# Patient Record
Sex: MALE | Race: WHITE | NOT HISPANIC OR LATINO | Employment: FULL TIME | URBAN - METROPOLITAN AREA
[De-identification: names, ages, dates, MRNs, and addresses within clinical notes are randomized per-mention and may not be internally consistent; named-entity substitution may affect disease eponyms.]

---

## 2018-04-30 LAB
25(OH)D3 SERPL-MCNC: 34.22 NG/ML
ALBUMIN SERPL BCP-MCNC: 4.5 G/DL (ref 3.5–5.7)
ALP SERPL-CCNC: 50 IU/L (ref 40–150)
ALT SERPL W P-5'-P-CCNC: 20 IU/L (ref 0–50)
ANION GAP SERPL CALCULATED.3IONS-SCNC: 10.5 MM/L
AST SERPL W P-5'-P-CCNC: 20 U/L (ref 8–27)
BASOPHILS # BLD AUTO: 0 X3/UL (ref 0–0.3)
BASOPHILS # BLD AUTO: 0.6 % (ref 0–2)
BILIRUB SERPL-MCNC: 0.5 MG/DL (ref 0.3–1)
BUN SERPL-MCNC: 9 MG/DL (ref 7–25)
CALCIUM SERPL-MCNC: 9.5 MG/DL (ref 8.6–10.5)
CHLORIDE SERPL-SCNC: 102 MM/L (ref 98–107)
CHOLEST SERPL-MCNC: 183 MG/DL (ref 0–200)
CO2 SERPL-SCNC: 32 MM/L (ref 21–31)
CREAT SERPL-MCNC: 0.97 MG/DL (ref 0.7–1.3)
DEPRECATED RDW RBC AUTO: 12.9 % (ref 11.5–14.5)
EGFR (HISTORICAL): > 60 GFR
EGFR AFRICAN AMERICAN (HISTORICAL): > 60 GFR
EOSINOPHIL # BLD AUTO: 0.1 X3/UL (ref 0–0.5)
EOSINOPHIL NFR BLD AUTO: 1.9 % (ref 0–5)
GLUCOSE (HISTORICAL): 105 MG/DL (ref 65–99)
HCT VFR BLD AUTO: 43 % (ref 42–52)
HDLC SERPL-MCNC: 57 MG/DL (ref 40–60)
HGB BLD-MCNC: 14.2 G/DL (ref 14–18)
LDLC SERPL CALC-MCNC: 109.7 MG/DL (ref 75–193)
LYMPHOCYTES # BLD AUTO: 1.7 X3/UL (ref 1.2–4.2)
LYMPHOCYTES NFR BLD AUTO: 40 % (ref 20.5–51.1)
MCH RBC QN AUTO: 28.7 PG (ref 26–34)
MCHC RBC AUTO-ENTMCNC: 33.1 G/DL (ref 31–36)
MCV RBC AUTO: 86.8 FL (ref 81–99)
MONOCYTES # BLD AUTO: 0.3 X3/UL (ref 0–1)
MONOCYTES NFR BLD AUTO: 6 % (ref 1.7–12)
NEUTROPHILS # BLD AUTO: 2.2 X3/UL (ref 1.4–6.5)
NEUTS SEG NFR BLD AUTO: 51.5 % (ref 42.2–75.2)
OSMOLALITY, SERUM (HISTORICAL): 278 MOSM (ref 262–291)
PLATELET # BLD AUTO: 237 X3/UL (ref 130–400)
PMV BLD AUTO: 8 FL (ref 8.6–11.7)
POTASSIUM SERPL-SCNC: 4.5 MM/L (ref 3.5–5.5)
PSA (HISTORICAL): 0.59 NG/ML (ref 0–4)
RBC # BLD AUTO: 4.95 X6/UL (ref 4.3–5.9)
SODIUM SERPL-SCNC: 140 MM/L (ref 134–143)
TOTAL PROTEIN (HISTORICAL): 7 G/DL (ref 6.4–8.9)
TRIGL SERPL-MCNC: 80 MG/DL (ref 44–166)
VLDL CHOLESTEROL (HISTORICAL): 16 MG/DL (ref 5–51)
WBC # BLD AUTO: 4.4 X3/UL (ref 4.8–10.8)

## 2019-05-21 ENCOUNTER — TRANSCRIBE ORDERS (OUTPATIENT)
Dept: ADMINISTRATIVE | Facility: HOSPITAL | Age: 49
End: 2019-05-21

## 2019-05-21 ENCOUNTER — APPOINTMENT (OUTPATIENT)
Dept: LAB | Facility: HOSPITAL | Age: 49
End: 2019-05-21
Payer: COMMERCIAL

## 2019-05-21 DIAGNOSIS — D72.819 LEUKOPENIA, UNSPECIFIED TYPE: ICD-10-CM

## 2019-05-21 DIAGNOSIS — N42.9 DISEASE OF PROSTATE: ICD-10-CM

## 2019-05-21 DIAGNOSIS — N42.9 DISEASE OF PROSTATE: Primary | ICD-10-CM

## 2019-05-21 DIAGNOSIS — Z51.81 ENCOUNTER FOR THERAPEUTIC DRUG MONITORING: ICD-10-CM

## 2019-05-21 DIAGNOSIS — R73.9 HYPERGLYCEMIA: ICD-10-CM

## 2019-05-21 DIAGNOSIS — R35.0 URINARY FREQUENCY: ICD-10-CM

## 2019-05-21 DIAGNOSIS — E78.5 HYPERLIPIDEMIA, UNSPECIFIED HYPERLIPIDEMIA TYPE: ICD-10-CM

## 2019-05-21 LAB
ALBUMIN SERPL BCP-MCNC: 4.5 G/DL (ref 3.5–5.7)
ALP SERPL-CCNC: 52 U/L (ref 40–150)
ALT SERPL W P-5'-P-CCNC: 47 U/L (ref 7–52)
ANION GAP SERPL CALCULATED.3IONS-SCNC: 4 MMOL/L (ref 4–13)
AST SERPL W P-5'-P-CCNC: 31 U/L (ref 13–39)
BASOPHILS # BLD AUTO: 0 THOUSANDS/ΜL (ref 0–0.1)
BASOPHILS NFR BLD AUTO: 1 % (ref 0–2)
BILIRUB SERPL-MCNC: 0.9 MG/DL (ref 0.2–1)
BUN SERPL-MCNC: 10 MG/DL (ref 7–25)
CALCIUM SERPL-MCNC: 10 MG/DL (ref 8.6–10.5)
CHLORIDE SERPL-SCNC: 103 MMOL/L (ref 98–107)
CHOLEST SERPL-MCNC: 193 MG/DL (ref 0–200)
CO2 SERPL-SCNC: 33 MMOL/L (ref 21–31)
CREAT SERPL-MCNC: 1.01 MG/DL (ref 0.7–1.3)
EOSINOPHIL # BLD AUTO: 0.1 THOUSAND/ΜL (ref 0–0.61)
EOSINOPHIL NFR BLD AUTO: 2 % (ref 0–5)
ERYTHROCYTE [DISTWIDTH] IN BLOOD BY AUTOMATED COUNT: 12.2 % (ref 11.5–14.5)
GFR SERPL CREATININE-BSD FRML MDRD: 86 ML/MIN/1.73SQ M
GLUCOSE P FAST SERPL-MCNC: 115 MG/DL (ref 65–99)
HCT VFR BLD AUTO: 44.2 % (ref 42–47)
HDLC SERPL-MCNC: 57 MG/DL (ref 40–60)
HGB BLD-MCNC: 14.9 G/DL (ref 14–18)
LDLC SERPL CALC-MCNC: 104 MG/DL (ref 0–100)
LYMPHOCYTES # BLD AUTO: 1.5 THOUSANDS/ΜL (ref 0.6–4.47)
LYMPHOCYTES NFR BLD AUTO: 31 % (ref 21–51)
MCH RBC QN AUTO: 29.4 PG (ref 26–34)
MCHC RBC AUTO-ENTMCNC: 33.8 G/DL (ref 31–37)
MCV RBC AUTO: 87 FL (ref 81–99)
MONOCYTES # BLD AUTO: 0.3 THOUSAND/ΜL (ref 0.17–1.22)
MONOCYTES NFR BLD AUTO: 6 % (ref 2–12)
NEUTROPHILS # BLD AUTO: 3 THOUSANDS/ΜL (ref 1.4–6.5)
NEUTS SEG NFR BLD AUTO: 61 % (ref 42–75)
NONHDLC SERPL-MCNC: 136 MG/DL
PLATELET # BLD AUTO: 227 THOUSANDS/UL (ref 149–390)
PMV BLD AUTO: 7.9 FL (ref 8.6–11.7)
POTASSIUM SERPL-SCNC: 4.3 MMOL/L (ref 3.5–5.5)
PROT SERPL-MCNC: 7.4 G/DL (ref 6.4–8.9)
PSA SERPL-MCNC: 0.5 NG/ML (ref 0–4)
RBC # BLD AUTO: 5.08 MILLION/UL (ref 4.3–5.9)
SODIUM SERPL-SCNC: 140 MMOL/L (ref 134–143)
TRIGL SERPL-MCNC: 158 MG/DL (ref 44–166)
WBC # BLD AUTO: 4.9 THOUSAND/UL (ref 4.8–10.8)

## 2019-05-21 PROCEDURE — 80053 COMPREHEN METABOLIC PANEL: CPT

## 2019-05-21 PROCEDURE — G0103 PSA SCREENING: HCPCS

## 2019-05-21 PROCEDURE — 36415 COLL VENOUS BLD VENIPUNCTURE: CPT

## 2019-05-21 PROCEDURE — 85025 COMPLETE CBC W/AUTO DIFF WBC: CPT

## 2019-05-21 PROCEDURE — 80061 LIPID PANEL: CPT

## 2019-12-14 ENCOUNTER — OFFICE VISIT (OUTPATIENT)
Dept: URGENT CARE | Facility: CLINIC | Age: 49
End: 2019-12-14
Payer: COMMERCIAL

## 2019-12-14 VITALS
BODY MASS INDEX: 20.76 KG/M2 | RESPIRATION RATE: 18 BRPM | OXYGEN SATURATION: 97 % | DIASTOLIC BLOOD PRESSURE: 70 MMHG | HEART RATE: 92 BPM | HEIGHT: 70 IN | TEMPERATURE: 99 F | WEIGHT: 145 LBS | SYSTOLIC BLOOD PRESSURE: 120 MMHG

## 2019-12-14 DIAGNOSIS — B96.89 ACUTE BACTERIAL BRONCHITIS: Primary | ICD-10-CM

## 2019-12-14 DIAGNOSIS — J20.8 ACUTE BACTERIAL BRONCHITIS: Primary | ICD-10-CM

## 2019-12-14 PROCEDURE — 99203 OFFICE O/P NEW LOW 30 MIN: CPT | Performed by: PHYSICIAN ASSISTANT

## 2019-12-14 RX ORDER — SILDENAFIL CITRATE 20 MG/1
TABLET ORAL
Refills: 2 | COMMUNITY
Start: 2019-10-21

## 2019-12-14 RX ORDER — GABAPENTIN 100 MG/1
CAPSULE ORAL
Refills: 0 | COMMUNITY
Start: 2019-12-03

## 2019-12-14 RX ORDER — HYDROCODONE BITARTRATE AND ACETAMINOPHEN 10; 325 MG/1; MG/1
1 TABLET ORAL EVERY 8 HOURS
Refills: 0 | COMMUNITY
Start: 2019-11-20

## 2019-12-14 RX ORDER — AZITHROMYCIN 250 MG/1
TABLET, FILM COATED ORAL
Qty: 6 TABLET | Refills: 0 | Status: SHIPPED | OUTPATIENT
Start: 2019-12-14 | End: 2019-12-18

## 2019-12-14 RX ORDER — PREGABALIN 75 MG/1
CAPSULE ORAL
Refills: 5 | COMMUNITY
Start: 2019-09-18

## 2019-12-14 RX ORDER — PREDNISONE 50 MG/1
50 TABLET ORAL DAILY
Qty: 5 TABLET | Refills: 0 | Status: SHIPPED | OUTPATIENT
Start: 2019-12-14 | End: 2019-12-19

## 2019-12-14 NOTE — PATIENT INSTRUCTIONS
Prescriptions sent to the pharmacy for prednisone and azithromycin-use as directed  May take over-the-counter decongestants/cold medication for symptoms  Saline nasal spray several times daily, cool mist humidifier, Tylenol/ibuprofen as needed for fever or pain  Follow up with PCP in 3-5 days  Proceed to  ER if symptoms worsen  Acute Bronchitis   AMBULATORY CARE:   Acute bronchitis  is swelling and irritation in the air passages of your lungs  This irritation may cause you to cough or have other breathing problems  Acute bronchitis often starts because of another illness, such as a cold or the flu  The illness spreads from your nose and throat to your windpipe and airways  Bronchitis is often called a chest cold  Acute bronchitis lasts about 3 to 6 weeks and is usually not a serious illness  Your cough can last for several weeks  You may have any of the following symptoms:   · A cough with sputum that may be clear, yellow, or green    · Feeling more tired than usual, and body aches    · A fever and chills    · Wheezing when you breathe    · A tight chest or pain when you breathe or cough  Seek care immediately if:   · You cough up blood  · Your lips or fingernails turn blue  · You feel like you are not getting enough air when you breathe  Contact your healthcare provider if:   · You have a fever  · Your breathing problems do not go away or get worse  · Your cough does not get better within 4 weeks  · You have questions or concerns about your condition or care  Self-care:   · Get more rest   Rest helps your body to heal  Slowly start to do more each day  Rest when you feel it is needed  · Avoid irritants in the air  Avoid chemicals, fumes, and dust  Wear a face mask if you must work around dust or fumes  Stay inside on days when air pollution levels are high  If you have allergies, stay inside when pollen counts are high   Do not use aerosol products, such as spray-on deodorant, bug spray, and hair spray  · Do not smoke or be around others who smoke  Nicotine and other chemicals in cigarettes and cigars damages the cilia that move mucus out of your lungs  Ask your healthcare provider for information if you currently smoke and need help to quit  E-cigarettes or smokeless tobacco still contain nicotine  Talk to your healthcare provider before you use these products  · Drink liquids as directed  Liquids help keep your air passages moist and help you cough up mucus  You may need to drink more liquids when you have acute bronchitis  Ask how much liquid to drink each day and which liquids are best for you  · Use a humidifier or vaporizer  Use a cool mist humidifier or a vaporizer to increase air moisture in your home  This may make it easier for you to breathe and help decrease your cough  Prevent acute bronchitis by doing the following:   · Get the vaccinations you need  Ask your healthcare provider if you should get vaccinated against the flu or pneumonia  · Prevent the spread of germs  You can decrease your risk of acute bronchitis and other illnesses by doing the following:     [facility] your hands often with soap and water  Carry germ-killing hand lotion or gel with you  You can use the lotion or gel to clean your hands when soap and water are not available  ¨ Do not touch your eyes, nose, or mouth unless you have washed your hands first     ¨ Always cover your mouth when you cough to prevent the spread of germs  It is best to cough into a tissue or your shirt sleeve instead of into your hand  Ask those around you cover their mouths when they cough  ¨ Try to avoid people who have a cold or the flu  If you are sick, stay away from others as much as possible  Medicines: Your healthcare provider may  give you any of the following:  · Ibuprofen or acetaminophen  are medicines that help lower your fever  They are available without a doctor's order   Ask your healthcare provider which medicine is right for you  Ask how much to take and how often to take it  Follow directions  These medicines can cause stomach bleeding if not taken correctly  Ibuprofen can cause kidney damage  Do not take ibuprofen if you have kidney disease, an ulcer, or allergies to aspirin  Acetaminophen can cause liver damage  Do not take more than 4,000 milligrams in 24 hours  · Decongestants  help loosen mucus in your lungs and make it easier to cough up  This can help you breathe easier  · Cough suppressants  decrease your urge to cough  If your cough produces mucus, do not take a cough suppressant unless your healthcare provider tells you to  Your healthcare provider may suggest that you take a cough suppressant at night so you can rest     · Inhalers  may be given  Your healthcare provider may give you one or more inhalers to help you breathe easier and cough less  An inhaler gives your medicine to open your airways  Ask your healthcare provider to show you how to use your inhaler correctly  Follow up with your healthcare provider as directed:  Write down questions you have so you will remember to ask them during your follow-up visits  © 2017 [facility] Information is for End User's use only and may not be sold, redistributed or otherwise used for commercial purposes  All illustrations and images included in CareNotes® are the copyrighted property of [employer]  or Jarod Quiles  The above information is an  only  It is not intended as medical advice for individual conditions or treatments  Talk to your doctor, nurse or pharmacist before following any medical regimen to see if it is safe and effective for you

## 2019-12-14 NOTE — PROGRESS NOTES
[facility]        NAME: Lesli Couch is a 44 y o  male  : 1970    MRN: [id number]  DATE: 2019  TIME: 1:07 PM    Assessment and Plan   Acute bacterial bronchitis [J20 8, B96 89]  1  Acute bacterial bronchitis  azithromycin (ZITHROMAX) 250 mg tablet    predniSONE 50 mg tablet         Patient Instructions   Prescriptions sent to the pharmacy for prednisone and azithromycin-use as directed  May take over-the-counter decongestants/cold medication for symptoms  Saline nasal spray several times daily, cool mist humidifier, Tylenol/ibuprofen as needed for fever or pain  Follow up with PCP in 3-5 days  Proceed to  ER if symptoms worsen  Chief Complaint     Chief Complaint   Patient presents with    Sore Throat     Patient has been sick for the last week    Cold Like Symptoms    Cough         History of Present Illness   The patient is a 24-year-old male who presents with cold symptoms for approximately 1 week  Positive nasal and sinus congestion  Negative facial pain or pressure  Negative headache  Positive fatigue  Positive fever  Positive sore throat with postnasal drip  Productive cough with occasional shortness of breath and chest tightness  Negative wheezing  Negative abdominal pain, nausea, vomiting or diarrhea  Negative myalgias  He is not taking any medication over-the-counter for his symptoms  HPI    Review of Systems   Review of Systems   Constitutional: Positive for chills, fatigue and fever  Negative for activity change  HENT: Positive for congestion, postnasal drip, rhinorrhea and sore throat  Negative for ear discharge, ear pain, facial swelling, mouth sores, sinus pressure, sinus pain, sneezing and trouble swallowing  Eyes: Negative for pain, discharge, redness and itching  Respiratory: Positive for cough, chest tightness and shortness of breath  Negative for apnea, wheezing and stridor  Cardiovascular: Negative for chest pain  Gastrointestinal: Negative for abdominal distention, abdominal pain, diarrhea, nausea and vomiting  Genitourinary: Negative for difficulty urinating  Musculoskeletal: Negative for arthralgias and myalgias  Skin: Negative for pallor and rash  Allergic/Immunologic: Negative  Neurological: Negative for dizziness, light-headedness and headaches  Hematological: Negative  Psychiatric/Behavioral: Negative  All other systems reviewed and are negative  Current Medications       Current Outpatient Medications:     azithromycin (ZITHROMAX) 250 mg tablet, Take 2 tablets on day 1 and then 1 tablet daily x 4 days, Disp: 6 tablet, Rfl: 0    gabapentin (NEURONTIN) 100 mg capsule, , Disp: , Rfl: 0    HYDROcodone-acetaminophen (NORCO)  mg per tablet, Take 1 tablet by mouth every 8 (eight) hours, Disp: , Rfl: 0    predniSONE 50 mg tablet, Take 1 tablet (50 mg total) by mouth daily for 5 days, Disp: 5 tablet, Rfl: 0    pregabalin (LYRICA) 75 mg capsule, TAKE CAPSULE TWICE DAILY BY MOUTH, Disp: , Rfl: 5    sildenafil (REVATIO) 20 mg tablet, TAKE 1 -3 TABLETS  [facility] AS REQUIRED   , Disp: , Rfl: 2    Current Allergies     Allergies as of 12/14/2019    (No Known Allergies)            The following portions of the patient's history were reviewed and updated as appropriate: allergies, current medications, past family history, past medical history, past social history, past surgical history and problem list      No past medical history on file  No past surgical history on file  No family history on file  Medications have been verified  Objective   /70 (BP Location: Right arm, Patient Position: Sitting, Cuff Size: Standard)   Pulse 92   Temp 99 °F (37 2 °C) (Temporal)   Resp 18   Ht 5' 10" (1 778 m)   Wt 65 8 kg (145 lb)   SpO2 97%   BMI 20 81 kg/m²        Physical Exam     Physical Exam   Constitutional: He is oriented to person, place, and time   Vital signs are normal  He appears well-developed and well-nourished  He appears ill  No distress  HENT:   Head: Normocephalic and atraumatic  Right Ear: Hearing, tympanic membrane, external ear and ear canal normal  No lacerations  No drainage, swelling or tenderness  No foreign bodies  No mastoid tenderness  Tympanic membrane is not injected, not scarred, not perforated, not erythematous, not retracted and not bulging  No middle ear effusion  No hemotympanum  No decreased hearing is noted  Left Ear: Hearing, tympanic membrane, external ear and ear canal normal  No lacerations  No drainage, swelling or tenderness  No foreign bodies  No mastoid tenderness  Tympanic membrane is not injected, not scarred, not perforated, not erythematous, not retracted and not bulging  No middle ear effusion  No hemotympanum  No decreased hearing is noted  Nose: Mucosal edema and rhinorrhea present  No septal deviation  Right sinus exhibits no maxillary sinus tenderness and no frontal sinus tenderness  Left sinus exhibits no maxillary sinus tenderness and no frontal sinus tenderness  Mouth/Throat: Uvula is midline and mucous membranes are normal  No oral lesions  No dental abscesses or uvula swelling  Posterior oropharyngeal erythema present  No oropharyngeal exudate, posterior oropharyngeal edema or tonsillar abscesses  Eyes: Pupils are equal, round, and reactive to light  Conjunctivae and EOM are normal    Neck: Trachea normal, normal range of motion and full passive range of motion without pain  Neck supple  No JVD present  No edema and no erythema present  No thyromegaly present  Cardiovascular: Normal rate, regular rhythm, S1 normal, S2 normal, normal heart sounds, intact distal pulses and normal pulses  No murmur heard  Pulmonary/Chest: Effort normal and breath sounds normal  No accessory muscle usage or stridor  No tachypnea  No respiratory distress  He has no decreased breath sounds  He has no wheezes  He has no rhonchi   He has no rales    Abdominal: Soft  Normal appearance and bowel sounds are normal  He exhibits no distension  There is no hepatosplenomegaly  There is no tenderness  Musculoskeletal: Normal range of motion  He exhibits no edema  Neurological: He is alert and oriented to person, place, and time  Skin: Skin is warm, dry and intact  No abrasion, no lesion and no rash noted  He is not diaphoretic  No cyanosis or erythema  Nails show no clubbing  Psychiatric: He has a normal mood and affect  His speech is normal and behavior is normal    Nursing note and vitals reviewed

## 2021-07-01 ENCOUNTER — HOSPITAL ENCOUNTER (EMERGENCY)
Facility: HOSPITAL | Age: 51
Discharge: HOME | End: 2021-07-01
Attending: EMERGENCY MEDICINE
Payer: COMMERCIAL

## 2021-07-01 VITALS
HEART RATE: 97 BPM | WEIGHT: 175 LBS | RESPIRATION RATE: 16 BRPM | DIASTOLIC BLOOD PRESSURE: 74 MMHG | BODY MASS INDEX: 24.5 KG/M2 | OXYGEN SATURATION: 97 % | HEIGHT: 71 IN | SYSTOLIC BLOOD PRESSURE: 122 MMHG | TEMPERATURE: 98.3 F

## 2021-07-01 DIAGNOSIS — A69.20 ERYTHEMA MIGRANS (LYME DISEASE): Primary | ICD-10-CM

## 2021-07-01 PROCEDURE — 86618 LYME DISEASE ANTIBODY: CPT | Performed by: EMERGENCY MEDICINE

## 2021-07-01 PROCEDURE — 99284 EMERGENCY DEPT VISIT MOD MDM: CPT | Performed by: EMERGENCY MEDICINE

## 2021-07-01 PROCEDURE — 36415 COLL VENOUS BLD VENIPUNCTURE: CPT | Performed by: EMERGENCY MEDICINE

## 2021-07-01 PROCEDURE — 99283 EMERGENCY DEPT VISIT LOW MDM: CPT

## 2021-07-01 RX ORDER — DOXYCYCLINE HYCLATE 100 MG/1
100 CAPSULE ORAL ONCE
Status: COMPLETED | OUTPATIENT
Start: 2021-07-01 | End: 2021-07-01

## 2021-07-01 RX ORDER — DOXYCYCLINE HYCLATE 100 MG/1
100 CAPSULE ORAL 2 TIMES DAILY
Qty: 42 CAPSULE | Refills: 0 | Status: SHIPPED | OUTPATIENT
Start: 2021-07-01 | End: 2021-07-22

## 2021-07-01 RX ADMIN — DOXYCYCLINE 100 MG: 100 CAPSULE ORAL at 22:58

## 2021-07-02 NOTE — DISCHARGE INSTRUCTIONS
Follow-up with your family doctor after the holiday for repeat evaluation they can follow up with the results of your blood lyme test   Take doxycycline 1 pill twice a day for 21 days  Finish all antibiotics  Return to the ER with any new, concerning, worsening issues  While taking antibiotics such as this, it would be in your benefit to take a probiotic or yogurt daily

## 2021-07-02 NOTE — ED PROVIDER NOTES
History  Chief Complaint   Patient presents with    Spider Bite     LEFT thigh, significant redness, slight burning, no itching or discharge     57-year-old male presents emergency department complaining of a rash which he notes to his left upper anterior thigh  He notes he was recently away and swimming and assumes that he got bit by a spider  He has mild discomfort to the area but no significant pain in the notes the area has started with a small centralized dark area with a extended lighter red area followed by any even lighter ring, encompassing a total diameter of about 12-13 cm  Patient denies any significant pain or fever or body aches  Patient denies any summer flu symptoms  His wife thinks that maybe this could be from Lyme  Patient comes to the ER for evaluation            Prior to Admission Medications   Prescriptions Last Dose Informant Patient Reported? Taking? HYDROcodone-acetaminophen (NORCO)  mg per tablet   Yes No   Sig: Take 1 tablet by mouth every 8 (eight) hours   gabapentin (NEURONTIN) 100 mg capsule   Yes No   pregabalin (LYRICA) 75 mg capsule   Yes No   Sig: TAKE CAPSULE TWICE DAILY BY MOUTH   sildenafil (REVATIO) 20 mg tablet   Yes No   Sig: TAKE 1 -3 TABLETS  Phyllis Peals AS REQUIRED    Facility-Administered Medications: None       History reviewed  No pertinent past medical history  History reviewed  No pertinent surgical history  History reviewed  No pertinent family history  I have reviewed and agree with the history as documented  E-Cigarette/Vaping     E-Cigarette/Vaping Substances     Social History     Tobacco Use    Smoking status: Never Smoker    Smokeless tobacco: Never Used   Substance Use Topics    Alcohol use: Not Currently    Drug use: Not Currently       Review of Systems   Constitutional: Negative for chills and fever  HENT: Negative for ear pain and sore throat  Eyes: Negative for pain and visual disturbance     Respiratory: Negative for cough and shortness of breath  Cardiovascular: Negative for chest pain and palpitations  Gastrointestinal: Negative for abdominal pain and vomiting  Genitourinary: Negative for dysuria and hematuria  Musculoskeletal: Negative for arthralgias, back pain, myalgias and neck pain  Skin: Positive for rash  Negative for color change  Neurological: Negative for seizures and syncope  All other systems reviewed and are negative  Physical Exam  Physical Exam  Vitals and nursing note reviewed  Constitutional:       Appearance: He is well-developed  HENT:      Head: Normocephalic and atraumatic  Eyes:      Conjunctiva/sclera: Conjunctivae normal    Cardiovascular:      Rate and Rhythm: Normal rate and regular rhythm  Heart sounds: No murmur heard  Pulmonary:      Effort: Pulmonary effort is normal  No respiratory distress  Breath sounds: Normal breath sounds  Abdominal:      Palpations: Abdomen is soft  Tenderness: There is no abdominal tenderness  Musculoskeletal:      Cervical back: Normal range of motion and neck supple  Skin:     General: Skin is warm and dry  Capillary Refill: Capillary refill takes less than 2 seconds  Findings: Rash present  Comments: Left anterior thigh shows a circular rash which is about 13 cm in diameter  This area has a small centralized deep red region with a slightly lighter area that is roughly 5 cm in diameter followed by and even lighter 6 cm band  This banding of a circular rash has a high concern for erythema migrans  Neurological:      General: No focal deficit present  Mental Status: He is alert           Vital Signs  ED Triage Vitals [07/01/21 2249]   Temperature Pulse Respirations Blood Pressure SpO2   98 3 °F (36 8 °C) 97 16 122/74 97 %      Temp src Heart Rate Source Patient Position - Orthostatic VS BP Location FiO2 (%)   -- -- Sitting Left arm --      Pain Score       --           Vitals:    07/01/21 2249   BP: 122/74 Pulse: 97   Patient Position - Orthostatic VS: Sitting         Visual Acuity      ED Medications  Medications   doxycycline hyclate (VIBRAMYCIN) capsule 100 mg (100 mg Oral Given 7/1/21 0448)       Diagnostic Studies  Results Reviewed     Procedure Component Value Units Date/Time    Lyme Antibody Profile with reflex to [facility] [[id number]] Collected: 07/01/21 0135    Lab Status: No result Specimen: Blood from Arm, Left                  No orders to display              Procedures  Procedures         ED Course                                           MDM    Disposition  Final diagnoses:   Erythema migrans (Lyme disease)     Time reflects when diagnosis was documented in both MDM as applicable and the Disposition within this note     Time User Action Codes Description Comment    7/1/2021 10:55 PM Galen Yoder Add [A69 20] Erythema migrans (Lyme disease)       ED Disposition     ED Disposition Condition Date/Time Comment    Discharge Stable Thu Jul 1, 2021 10:57 PM Marcelina Encarnacion discharge to home/self care  Follow-up Information     Follow up With Specialties Details Why Javier Carty MD Family Medicine On 7/6/2021  [address] [place] [address]            Discharge Medication List as of 7/1/2021 10:57 PM      START taking these medications    Details   doxycycline hyclate (VIBRAMYCIN) 100 mg capsule Take 1 capsule (100 mg total) by mouth 2 (two) times a day for 21 days, Starting u 7/1/2021, Until u 7/22/2021, Normal         CONTINUE these medications which have NOT CHANGED    Details   gabapentin (NEURONTIN) 100 mg capsule Starting Tue 12/3/2019, Historical Med      HYDROcodone-acetaminophen (NORCO)  mg per tablet Take 1 tablet by mouth every 8 (eight) hours, Starting Wed 11/20/2019, Historical Med      pregabalin (LYRICA) 75 mg capsule TAKE CAPSULE TWICE DAILY BY MOUTH, Historical Med      sildenafil (REVATIO) 20 mg tablet TAKE 1 -3 TABLETS  Norma Lightning AS REQUIRED   , Historical Med           No discharge procedures on file      PDMP Review     None          ED Provider  Electronically Signed by           Manolo Anton DO  07/01/21 4446

## 2021-07-03 ENCOUNTER — HOSPITAL ENCOUNTER (EMERGENCY)
Facility: HOSPITAL | Age: 51
Discharge: HOME | End: 2021-07-03
Attending: FAMILY MEDICINE
Payer: COMMERCIAL

## 2021-07-03 VITALS
BODY MASS INDEX: 24.5 KG/M2 | WEIGHT: 175 LBS | RESPIRATION RATE: 16 BRPM | DIASTOLIC BLOOD PRESSURE: 78 MMHG | OXYGEN SATURATION: 99 % | HEART RATE: 72 BPM | HEIGHT: 71 IN | SYSTOLIC BLOOD PRESSURE: 125 MMHG | TEMPERATURE: 97.9 F

## 2021-07-03 DIAGNOSIS — L03.90 CELLULITIS: Primary | ICD-10-CM

## 2021-07-03 LAB
ALBUMIN SERPL BCP-MCNC: 4.7 G/DL (ref 3.5–5.7)
ALP SERPL-CCNC: 64 U/L (ref 40–150)
ALT SERPL W P-5'-P-CCNC: 23 U/L (ref 7–52)
ANION GAP SERPL CALCULATED.3IONS-SCNC: 5 MMOL/L (ref 4–13)
APTT PPP: 31 SECONDS (ref 23–37)
AST SERPL W P-5'-P-CCNC: 23 U/L (ref 13–39)
B BURGDOR IGG+IGM SER-ACNC: 8
BASOPHILS # BLD AUTO: 0.1 THOUSANDS/ΜL (ref 0–0.1)
BASOPHILS NFR BLD AUTO: 1 % (ref 0–2)
BILIRUB SERPL-MCNC: 1 MG/DL (ref 0.2–1)
BUN SERPL-MCNC: 11 MG/DL (ref 7–25)
CALCIUM SERPL-MCNC: 10.1 MG/DL (ref 8.6–10.5)
CHLORIDE SERPL-SCNC: 98 MMOL/L (ref 98–107)
CO2 SERPL-SCNC: 32 MMOL/L (ref 21–31)
CREAT SERPL-MCNC: 1.2 MG/DL (ref 0.7–1.3)
EOSINOPHIL # BLD AUTO: 0.1 THOUSAND/ΜL (ref 0–0.61)
EOSINOPHIL NFR BLD AUTO: 2 % (ref 0–5)
ERYTHROCYTE [DISTWIDTH] IN BLOOD BY AUTOMATED COUNT: 12.8 % (ref 11.5–14.5)
GFR SERPL CREATININE-BSD FRML MDRD: 69 ML/MIN/1.73SQ M
GLUCOSE SERPL-MCNC: 98 MG/DL (ref 65–99)
HCT VFR BLD AUTO: 46.6 % (ref 42–47)
HGB BLD-MCNC: 15.6 G/DL (ref 14–18)
INR PPP: 0.95 (ref 0.84–1.19)
LACTATE SERPL-SCNC: 0.6 MMOL/L (ref 0.5–2)
LYMPHOCYTES # BLD AUTO: 2 THOUSANDS/ΜL (ref 0.6–4.47)
LYMPHOCYTES NFR BLD AUTO: 36 % (ref 21–51)
MCH RBC QN AUTO: 29.2 PG (ref 26–34)
MCHC RBC AUTO-ENTMCNC: 33.5 G/DL (ref 31–37)
MCV RBC AUTO: 87 FL (ref 81–99)
MONOCYTES # BLD AUTO: 0.4 THOUSAND/ΜL (ref 0.17–1.22)
MONOCYTES NFR BLD AUTO: 8 % (ref 2–12)
NEUTROPHILS # BLD AUTO: 3.1 THOUSANDS/ΜL (ref 1.4–6.5)
NEUTS SEG NFR BLD AUTO: 53 % (ref 42–75)
PLATELET # BLD AUTO: 249 THOUSANDS/UL (ref 149–390)
PMV BLD AUTO: 8 FL (ref 8.6–11.7)
POTASSIUM SERPL-SCNC: 4.1 MMOL/L (ref 3.5–5.5)
PROCALCITONIN SERPL-MCNC: <0.05 NG/ML
PROT SERPL-MCNC: 8.4 G/DL (ref 6.4–8.9)
PROTHROMBIN TIME: 12.6 SECONDS (ref 11.6–14.5)
RBC # BLD AUTO: 5.35 MILLION/UL (ref 4.3–5.9)
SODIUM SERPL-SCNC: 135 MMOL/L (ref 134–143)
WBC # BLD AUTO: 5.7 THOUSAND/UL (ref 4.8–10.8)

## 2021-07-03 PROCEDURE — 36415 COLL VENOUS BLD VENIPUNCTURE: CPT | Performed by: PHYSICIAN ASSISTANT

## 2021-07-03 PROCEDURE — 85730 THROMBOPLASTIN TIME PARTIAL: CPT | Performed by: PHYSICIAN ASSISTANT

## 2021-07-03 PROCEDURE — 84145 PROCALCITONIN (PCT): CPT | Performed by: PHYSICIAN ASSISTANT

## 2021-07-03 PROCEDURE — 80053 COMPREHEN METABOLIC PANEL: CPT | Performed by: PHYSICIAN ASSISTANT

## 2021-07-03 PROCEDURE — 85610 PROTHROMBIN TIME: CPT | Performed by: PHYSICIAN ASSISTANT

## 2021-07-03 PROCEDURE — 85025 COMPLETE CBC W/AUTO DIFF WBC: CPT | Performed by: PHYSICIAN ASSISTANT

## 2021-07-03 PROCEDURE — 99284 EMERGENCY DEPT VISIT MOD MDM: CPT | Performed by: PHYSICIAN ASSISTANT

## 2021-07-03 PROCEDURE — 87040 BLOOD CULTURE FOR BACTERIA: CPT | Performed by: PHYSICIAN ASSISTANT

## 2021-07-03 PROCEDURE — 99282 EMERGENCY DEPT VISIT SF MDM: CPT

## 2021-07-03 PROCEDURE — 96366 THER/PROPH/DIAG IV INF ADDON: CPT

## 2021-07-03 PROCEDURE — 96365 THER/PROPH/DIAG IV INF INIT: CPT

## 2021-07-03 PROCEDURE — 83605 ASSAY OF LACTIC ACID: CPT | Performed by: PHYSICIAN ASSISTANT

## 2021-07-03 RX ORDER — CEPHALEXIN 500 MG/1
500 CAPSULE ORAL EVERY 6 HOURS SCHEDULED
Qty: 28 CAPSULE | Refills: 0 | Status: SHIPPED | OUTPATIENT
Start: 2021-07-03 | End: 2021-07-10

## 2021-07-03 RX ADMIN — VANCOMYCIN HYDROCHLORIDE 1250 MG: 1 INJECTION, POWDER, LYOPHILIZED, FOR SOLUTION INTRAVENOUS at 11:32

## 2021-07-03 NOTE — ED PROVIDER NOTES
History  Chief Complaint   Patient presents with   [address]     Patient bit by bug earlier this week  Patient seen and on antibiotics  Patient wife laura [place] around bite and redness has started outside of the [place]  66-year-old male presents to the emergency department seeking evaluation for worsening cellulitis  He states he was bit by unknown insect on the left thigh earlier this week  He was seen 2 days ago in the emergency department given antibiotics at that time  The patient's wife laura a [place] around the area of concern  The cellulitis is subsequently sprea well beyond the the line drawn by the patient's wife  Patient reports feeling well otherwise  Subjective chills last night however no documented fevers  He is well-appearing in no acute distress  He denies pain or tenderness the area  There is no crepitus upon palpation of the area  Patient denies exquisite tenderness the area  The area is dry  Prior to Admission Medications   Prescriptions Last Dose Informant Patient Reported? Taking? HYDROcodone-acetaminophen (NORCO)  mg per tablet 7/3/2021 at Unknown time  Yes Yes   Sig: Take 1 tablet by mouth every 8 (eight) hours   doxycycline hyclate (VIBRAMYCIN) 100 mg capsule 7/3/2021 at Unknown time  No Yes   Sig: Take 1 capsule (100 mg total) by mouth 2 (two) times a day for 21 days   gabapentin (NEURONTIN) 100 mg capsule 7/3/2021 at Unknown time  Yes Yes   pregabalin (LYRICA) 75 mg capsule Not Taking at Unknown time  Yes No   Sig: TAKE CAPSULE TWICE DAILY BY MOUTH   Patient not taking: Reported on 7/3/2021   sildenafil (REVATIO) 20 mg tablet Past Week at Unknown time  Yes Yes   Sig: TAKE 1 -3 TABLETS  Usman Carmencita AS REQUIRED    Facility-Administered Medications: None       History reviewed  No pertinent past medical history  History reviewed  No pertinent surgical history  History reviewed  No pertinent family history    I have reviewed and agree with the history as documented  E-Cigarette/Vaping     E-Cigarette/Vaping Substances     Social History     Tobacco Use    Smoking status: Never Smoker    Smokeless tobacco: Never Used   Substance Use Topics    Alcohol use: Not Currently    Drug use: Not Currently       Review of Systems   Constitutional: Negative for chills, fatigue and fever  HENT: Negative for congestion, ear pain, rhinorrhea, sinus pressure, sneezing and sore throat  Eyes: Negative for pain and discharge  Respiratory: Negative for cough, choking, chest tightness, shortness of breath and wheezing  Cardiovascular: Negative for chest pain and palpitations  Gastrointestinal: Negative for abdominal pain, constipation, diarrhea, nausea and vomiting  Genitourinary: Negative for difficulty urinating and dysuria  Musculoskeletal: Negative for back pain, gait problem, neck pain and neck stiffness  Skin: Positive for rash (Left anterior thigh)  Neurological: Negative for dizziness, light-headedness and headaches  All other systems reviewed and are negative  Physical Exam  Physical Exam  Vitals and nursing note reviewed  Constitutional:       General: He is not in acute distress  Appearance: He is well-developed  He is not ill-appearing  HENT:      Head: Normocephalic and atraumatic  Right Ear: External ear normal       Left Ear: External ear normal       Nose: Nose normal    Eyes:      Pupils: Pupils are equal, round, and reactive to light  Cardiovascular:      Rate and Rhythm: Normal rate and regular rhythm  Heart sounds: Normal heart sounds  No murmur heard  No friction rub  No gallop  Pulmonary:      Effort: Pulmonary effort is normal  No respiratory distress  Breath sounds: Normal breath sounds  No stridor  No wheezing or rales  Abdominal:      General: Bowel sounds are normal  There is no distension  Palpations: Abdomen is soft  Tenderness: There is no abdominal tenderness  There is no guarding  Musculoskeletal:         General: No tenderness  Normal range of motion  Cervical back: Normal range of motion and neck supple  Skin:     General: Skin is warm  Capillary Refill: Capillary refill takes less than 2 seconds  Comments: Area compatible cellulitis to the left anterior thigh  Was marked with skin marker  Neurological:      Mental Status: He is alert and oriented to person, place, and time  Psychiatric:         Behavior: Behavior is cooperative           Vital Signs  ED Triage Vitals [07/03/21 1100]   Temperature Pulse Respirations Blood Pressure SpO2   97 9 °F (36 6 °C) 77 16 122/82 95 %      Temp Source Heart Rate Source Patient Position - Orthostatic VS BP Location FiO2 (%)   Tympanic Monitor Sitting Left arm --      Pain Score       No Pain           Vitals:    07/03/21 1100 07/03/21 1343   BP: 122/82 125/78   Pulse: 77 72   Patient Position - Orthostatic VS: Sitting Lying         Visual Acuity      ED Medications  Medications   vancomycin (VANCOCIN) 1,250 mg in sodium chloride 0 9 % 250 mL IVPB (0 mg/kg × 79 4 kg Intravenous Stopped 7/3/21 1343)       Diagnostic Studies  Results Reviewed     Procedure Component Value Units Date/Time    Comprehensive metabolic panel [[id number]]  (Abnormal) Collected: 07/03/21 1126    Lab Status: Final result Specimen: Blood from Arm, Left Updated: 07/03/21 1155     Sodium 135 mmol/L      Potassium 4 1 mmol/L      Chloride 98 mmol/L      CO2 32 mmol/L      ANION GAP 5 mmol/L      BUN 11 mg/dL      Creatinine 1 20 mg/dL      Glucose 98 mg/dL      Calcium 10 1 mg/dL      AST 23 U/L      ALT 23 U/L      Alkaline Phosphatase 64 U/L      Total Protein 8 4 g/dL      Albumin 4 7 g/dL      Total Bilirubin 1 00 mg/dL      eGFR 69 ml/min/1 73sq m     Narrative:      [place] guidelines for Chronic Kidney Disease (CKD):     Stage 1 with normal or high GFR (GFR > 90 mL/min/1 73 square meters)    Stage 2 Mild CKD (GFR = 60-89 mL/min/1 73 square meters)    Stage 3A Moderate CKD (GFR = 45-59 mL/min/1 73 square meters)    Stage 3B Moderate CKD (GFR = 30-44 mL/min/1 73 square meters)    Stage 4 Severe CKD (GFR = 15-29 mL/min/1 73 square meters)    Stage 5 End Stage CKD (GFR <15 mL/min/1 73 square meters)  Note: GFR calculation is accurate only with a steady state creatinine    Lactic acid [[id number]]  (Normal) Collected: 07/03/21 1126    Lab Status: Final result Specimen: Blood from Arm, Left Updated: 07/03/21 1153     LACTIC ACID 0 6 mmol/L     Narrative:      Result may be elevated if tourniquet was used during collection  Protime-INR [[id number]]  (Normal) Collected: 07/03/21 1126    Lab Status: Final result Specimen: Blood from Arm, Left Updated: 07/03/21 1150     Protime 12 6 seconds      INR 0 95    APTT [[id number]]  (Normal) Collected: 07/03/21 1126    Lab Status: Final result Specimen: Blood from Arm, Left Updated: 07/03/21 1150     PTT 31 seconds     CBC and differential [[id number]]  (Abnormal) Collected: 07/03/21 1126    Lab Status: Final result Specimen: Blood from Arm, Left Updated: 07/03/21 1150     WBC 5 70 Thousand/uL      RBC 5 35 Million/uL      Hemoglobin 15 6 g/dL      Hematocrit 46 6 %      MCV 87 fL      MCH 29 2 pg      MCHC 33 5 g/dL      RDW 12 8 %      MPV 8 0 fL      Platelets 294 Thousands/uL      Neutrophils Relative 53 %      Lymphocytes Relative 36 %      Monocytes Relative 8 %      Eosinophils Relative 2 %      Basophils Relative 1 %      Neutrophils Absolute 3 10 Thousands/µL      Lymphocytes Absolute 2 00 Thousands/µL      Monocytes Absolute 0 40 Thousand/µL      Eosinophils Absolute 0 10 Thousand/µL      Basophils Absolute 0 10 Thousands/µL     Procalcitonin with AM Reflex [[id number]] Collected: 07/03/21 1126    Lab Status: In process Specimen: Blood from Arm, Left Updated: 07/03/21 1135    Blood culture #1 [[id number]] Collected: 07/03/21 1126    Lab Status:  In process Specimen: Blood from Arm, Left Updated: 07/03/21 1135    Blood culture #2 [[id number]] Collected: 07/03/21 1126    Lab Status: In process Specimen: Blood from Arm, Right Updated: 07/03/21 1135                 No orders to display              Procedures  Procedures         ED Course                             SBIRT 22yo+      Most Recent Value   SBIRT (23 yo +)   In order to provide better care to our patients, we are screening all of our patients for alcohol and drug use  Would it be okay to ask you these screening questions? Unable to answer at this time Filed at: 07/03/2021 1102                    MDM  Number of Diagnoses or Management Options  Cellulitis: new and requires workup  Diagnosis management comments: Dose of IV Vanco given in the ED  Keflex added to course of doxycycline  Recommend close follow-up with PCP  Labs unremarkable  Patient educated regarding their diagnosis and given return and follow-up instructions  Patient was advised to returned to the ED with worsening symptoms or concerns  Patient is understanding of and in agreement with the treatment plan  There are no questions at the time of discharge  Amount and/or Complexity of Data Reviewed  Clinical lab tests: ordered and reviewed    Risk of Complications, Morbidity, and/or Mortality  Presenting problems: moderate  Diagnostic procedures: low  Management options: low    Patient Progress  Patient progress: stable      Disposition  Final diagnoses:   Cellulitis     Time reflects when diagnosis was documented in both MDM as applicable and the Disposition within this note     Time User Action Codes Description Comment    7/3/2021 12:57 PM Zo Pinto Add [L03 90] Cellulitis       ED Disposition     ED Disposition Condition Date/Time Comment    Discharge Stable Sat Jul 3, 2021 12:58 PM Nile Soda discharge to home/self care              Follow-up Information     Follow up With Specialties Details Why [address] [place] [address] [facility]            Discharge Medication List as of 7/3/2021  1:41 PM      START taking these medications    Details   cephalexin (KEFLEX) 500 mg capsule Take 1 capsule (500 mg total) by mouth every 6 (six) hours for 7 days, Starting Sat 7/3/2021, Until Sat 7/10/2021, Normal         CONTINUE these medications which have NOT CHANGED    Details   doxycycline hyclate (VIBRAMYCIN) 100 mg capsule Take 1 capsule (100 mg total) by mouth 2 (two) times a day for 21 days, Starting Thu 7/1/2021, Until Thu 7/22/2021, Normal      gabapentin (NEURONTIN) 100 mg capsule Starting Tue 12/3/2019, Historical Med      HYDROcodone-acetaminophen (NORCO)  mg per tablet Take 1 tablet by mouth every 8 (eight) hours, Starting Wed 11/20/2019, Historical Med      sildenafil (REVATIO) 20 mg tablet TAKE 1 -3 TABLETS  Dana Holcomb AS REQUIRED   , Historical Med      pregabalin (LYRICA) 75 mg capsule TAKE CAPSULE TWICE DAILY BY MOUTH, Historical Med           No discharge procedures on file      PDMP Review     None          ED Provider  Electronically Signed by           Shona You PA-C  07/03/21 3119

## 2021-07-08 LAB
BACTERIA BLD CULT: NORMAL
BACTERIA BLD CULT: NORMAL

## 2021-10-06 ENCOUNTER — OFFICE VISIT (OUTPATIENT)
Dept: URGENT CARE | Facility: CLINIC | Age: 51
End: 2021-10-06
Payer: COMMERCIAL

## 2021-10-06 VITALS
RESPIRATION RATE: 18 BRPM | OXYGEN SATURATION: 98 % | DIASTOLIC BLOOD PRESSURE: 95 MMHG | BODY MASS INDEX: 24.5 KG/M2 | TEMPERATURE: 98.7 F | HEIGHT: 71 IN | WEIGHT: 175 LBS | SYSTOLIC BLOOD PRESSURE: 135 MMHG | HEART RATE: 88 BPM

## 2021-10-06 DIAGNOSIS — J06.9 UPPER RESPIRATORY TRACT INFECTION, UNSPECIFIED TYPE: Primary | ICD-10-CM

## 2021-10-06 DIAGNOSIS — J02.9 SORETHROAT: ICD-10-CM

## 2021-10-06 LAB — S PYO AG THROAT QL: NEGATIVE

## 2021-10-06 PROCEDURE — 87070 CULTURE OTHR SPECIMN AEROBIC: CPT | Performed by: PHYSICIAN ASSISTANT

## 2021-10-06 PROCEDURE — U0003 INFECTIOUS AGENT DETECTION BY NUCLEIC ACID (DNA OR RNA); SEVERE ACUTE RESPIRATORY SYNDROME CORONAVIRUS 2 (SARS-COV-2) (CORONAVIRUS DISEASE [COVID-19]), AMPLIFIED PROBE TECHNIQUE, MAKING USE OF HIGH THROUGHPUT TECHNOLOGIES AS DESCRIBED BY CMS-2020-01-R: HCPCS | Performed by: PHYSICIAN ASSISTANT

## 2021-10-06 PROCEDURE — U0005 INFEC AGEN DETEC AMPLI PROBE: HCPCS | Performed by: PHYSICIAN ASSISTANT

## 2021-10-06 PROCEDURE — 99213 OFFICE O/P EST LOW 20 MIN: CPT | Performed by: PHYSICIAN ASSISTANT

## 2021-10-06 PROCEDURE — 87880 STREP A ASSAY W/OPTIC: CPT | Performed by: PHYSICIAN ASSISTANT

## 2021-10-06 RX ORDER — AMOXICILLIN 875 MG/1
875 TABLET, COATED ORAL 2 TIMES DAILY
Qty: 20 TABLET | Refills: 0 | Status: SHIPPED | OUTPATIENT
Start: 2021-10-06 | End: 2021-10-16

## 2021-10-06 RX ORDER — SOD SULF/POT CHLORIDE/MAG SULF 1.479 G
TABLET ORAL
COMMUNITY
Start: 2021-08-26

## 2021-10-07 LAB — SARS-COV-2 RNA RESP QL NAA+PROBE: NEGATIVE

## 2021-10-08 LAB — BACTERIA THROAT CULT: NORMAL

## 2023-01-04 ENCOUNTER — OFFICE VISIT (OUTPATIENT)
Dept: CARDIOLOGY CLINIC | Facility: CLINIC | Age: 53
End: 2023-01-04

## 2023-01-04 VITALS
HEART RATE: 80 BPM | SYSTOLIC BLOOD PRESSURE: 140 MMHG | BODY MASS INDEX: 27.02 KG/M2 | WEIGHT: 193 LBS | DIASTOLIC BLOOD PRESSURE: 90 MMHG | HEIGHT: 71 IN

## 2023-01-04 DIAGNOSIS — Z13.220 SCREENING FOR HYPERLIPIDEMIA: ICD-10-CM

## 2023-01-04 DIAGNOSIS — I71.21 ANEURYSM OF ASCENDING AORTA WITHOUT RUPTURE: ICD-10-CM

## 2023-01-04 DIAGNOSIS — R03.0 ELEVATED BLOOD PRESSURE READING WITHOUT DIAGNOSIS OF HYPERTENSION: Primary | ICD-10-CM

## 2023-01-04 DIAGNOSIS — Z87.891 FORMER TOBACCO USE: ICD-10-CM
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
## 2023-01-18 ENCOUNTER — HOSPITAL ENCOUNTER (OUTPATIENT)
Dept: NON INVASIVE DIAGNOSTICS | Facility: CLINIC | Age: 53
Discharge: HOME | End: 2023-01-18

## 2023-01-18 VITALS
HEIGHT: 71 IN | DIASTOLIC BLOOD PRESSURE: 90 MMHG | BODY MASS INDEX: 27.02 KG/M2 | SYSTOLIC BLOOD PRESSURE: 140 MMHG | WEIGHT: 193 LBS | HEART RATE: 74 BPM

## 2023-01-18 DIAGNOSIS — I71.21 ANEURYSM OF ASCENDING AORTA WITHOUT RUPTURE: ICD-10-CM

## 2023-01-18 DIAGNOSIS — R03.0 ELEVATED BLOOD PRESSURE READING WITHOUT DIAGNOSIS OF HYPERTENSION: ICD-10-CM

## 2023-01-18 LAB
AORTIC ROOT: 3.2 CM
AORTIC VALVE MEAN VELOCITY: 6.5 M/S
APICAL FOUR CHAMBER EJECTION FRACTION: 50 %
ASCENDING AORTA: 3.8 CM
AV AREA BY CONTINUOUS VTI: 3.2 CM2
AV AREA PEAK VELOCITY: 3.2 CM2
AV LVOT MEAN GRADIENT: 1 MMHG
AV LVOT PEAK GRADIENT: 2 MMHG
AV MEAN GRADIENT: 2 MMHG
AV PEAK GRADIENT: 3 MMHG
AV VALVE AREA: 3.23 CM2
AV VELOCITY RATIO: 0.83
DOP CALC AO PEAK VEL: 0.9 M/S
DOP CALC AO VTI: 17.87 CM
DOP CALC LVOT AREA: 3.8 CM2
DOP CALC LVOT DIAMETER: 2.2 CM
DOP CALC LVOT PEAK VEL VTI: 15.21 CM
DOP CALC LVOT PEAK VEL: 0.75 M/S
DOP CALC LVOT STROKE INDEX: 28.4 ML/M2
DOP CALC LVOT STROKE VOLUME: 57.79
E WAVE DECELERATION TIME: 282 MS
FRACTIONAL SHORTENING: 29 (ref 28–44)
INTERVENTRICULAR SEPTUM IN DIASTOLE (PARASTERNAL SHORT AXIS VIEW): 0.9 CM
INTERVENTRICULAR SEPTUM: 0.9 CM (ref 0.6–1.1)
LAAS-AP2: 15 CM2
LAAS-AP4: 11.9 CM2
LEFT ATRIUM SIZE: 3 CM
LEFT INTERNAL DIMENSION IN SYSTOLE: 3.6 CM (ref 2.1–4)
LEFT VENTRICULAR INTERNAL DIMENSION IN DIASTOLE: 5.1 CM (ref 3.5–6)
LEFT VENTRICULAR POSTERIOR WALL IN END DIASTOLE: 0.9 CM
LEFT VENTRICULAR STROKE VOLUME: 67 ML
LVSV (TEICH): 67 ML
MV E'TISSUE VEL-SEP: 10 CM/S
MV PEAK A VEL: 0.65 M/S
MV PEAK E VEL: 51 CM/S
MV STENOSIS PRESSURE HALF TIME: 82 MS
MV VALVE AREA P 1/2 METHOD: 2.68
RIGHT ATRIUM AREA SYSTOLE A4C: 9.8 CM2
RIGHT VENTRICLE ID DIMENSION: 3.3 CM
SL CV LEFT ATRIUM LENGTH A2C: 4 CM
SL CV LV EF: 55
SL CV PED ECHO LEFT VENTRICLE DIASTOLIC VOLUME (MOD BIPLANE) 2D: 121 ML
SL CV PED ECHO LEFT VENTRICLE SYSTOLIC VOLUME (MOD BIPLANE) 2D: 54 ML
TR MAX PG: 16 MMHG
TR PEAK VELOCITY: 2 M/S
TRICUSPID ANNULAR PLANE SYSTOLIC EXCURSION: 2.1 CM
TRICUSPID VALVE PEAK REGURGITATION VELOCITY: 2 M/S

## 2023-01-23 ENCOUNTER — OFFICE VISIT (OUTPATIENT)
Dept: CARDIAC SURGERY | Facility: CLINIC | Age: 53
End: 2023-01-23

## 2023-01-23 VITALS
BODY MASS INDEX: 26.7 KG/M2 | TEMPERATURE: 97.3 F | DIASTOLIC BLOOD PRESSURE: 96 MMHG | OXYGEN SATURATION: 96 % | HEART RATE: 86 BPM | WEIGHT: 190.7 LBS | HEIGHT: 71 IN | SYSTOLIC BLOOD PRESSURE: 139 MMHG

## 2023-01-23 DIAGNOSIS — I71.21 ANEURYSM OF ASCENDING AORTA WITHOUT RUPTURE: ICD-10-CM

## 2023-01-23 DIAGNOSIS — I71.20 THORACIC AORTIC ANEURYSM WITHOUT RUPTURE, UNSPECIFIED PART: Primary | ICD-10-CM

## 2023-01-23 DIAGNOSIS — Z00.00 ROUTINE HEALTH MAINTENANCE: ICD-10-CM

## 2023-01-23 RX ORDER — LIDOCAINE 50 MG/G
PATCH TOPICAL
COMMUNITY
Start: 2023-01-10
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
## 2023-03-31 ENCOUNTER — TELEPHONE (OUTPATIENT)
Dept: GASTROENTEROLOGY | Facility: CLINIC | Age: 53
End: 2023-03-31

## 2023-05-22 ENCOUNTER — APPOINTMENT (OUTPATIENT)
Dept: LAB | Facility: CLINIC | Age: 53
End: 2023-05-22

## 2023-05-22 DIAGNOSIS — R73.9 HYPERGLYCEMIA: ICD-10-CM

## 2023-05-22 DIAGNOSIS — Z79.899 ENCOUNTER FOR LONG-TERM (CURRENT) USE OF OTHER MEDICATIONS: ICD-10-CM

## 2023-05-22 DIAGNOSIS — E55.9 VITAMIN D DEFICIENCY: ICD-10-CM

## 2023-05-22 DIAGNOSIS — I10 HYPERTENSION, UNSPECIFIED TYPE: ICD-10-CM

## 2023-05-22 DIAGNOSIS — N42.9 PROSTATE DISORDER: ICD-10-CM

## 2023-05-22 DIAGNOSIS — R63.5 WEIGHT GAIN: ICD-10-CM

## 2023-05-22 DIAGNOSIS — E78.5 HYPERLIPIDEMIA, UNSPECIFIED HYPERLIPIDEMIA TYPE: ICD-10-CM

## 2023-05-22 LAB
25(OH)D3 SERPL-MCNC: 26.1 NG/ML (ref 30–100)
ALBUMIN SERPL BCP-MCNC: 3.7 G/DL (ref 3.5–5)
ALP SERPL-CCNC: 56 U/L (ref 46–116)
ALT SERPL W P-5'-P-CCNC: 24 U/L (ref 12–78)
ANION GAP SERPL CALCULATED.3IONS-SCNC: -2 MMOL/L (ref 4–13)
AST SERPL W P-5'-P-CCNC: 14 U/L (ref 5–45)
BILIRUB SERPL-MCNC: 0.88 MG/DL (ref 0.2–1)
BUN SERPL-MCNC: 13 MG/DL (ref 5–25)
CALCIUM SERPL-MCNC: 8.8 MG/DL (ref 8.3–10.1)
CHLORIDE SERPL-SCNC: 110 MMOL/L (ref 96–108)
CHOLEST SERPL-MCNC: 197 MG/DL
CO2 SERPL-SCNC: 30 MMOL/L (ref 21–32)
CREAT SERPL-MCNC: 1 MG/DL (ref 0.6–1.3)
ERYTHROCYTE [DISTWIDTH] IN BLOOD BY AUTOMATED COUNT: 11.5 % (ref 11.6–15.1)
EST. AVERAGE GLUCOSE BLD GHB EST-MCNC: 105 MG/DL
GFR SERPL CREATININE-BSD FRML MDRD: 84 ML/MIN/1.73SQ M
GLUCOSE P FAST SERPL-MCNC: 110 MG/DL (ref 65–99)
HBA1C MFR BLD: 5.3 %
HCT VFR BLD AUTO: 43.5 % (ref 36.5–49.3)
HDLC SERPL-MCNC: 63 MG/DL
HGB BLD-MCNC: 14.4 G/DL (ref 12–17)
LDLC SERPL CALC-MCNC: 118 MG/DL (ref 0–100)
MCH RBC QN AUTO: 28.9 PG (ref 26.8–34.3)
MCHC RBC AUTO-ENTMCNC: 33.1 G/DL (ref 31.4–37.4)
MCV RBC AUTO: 87 FL (ref 82–98)
NONHDLC SERPL-MCNC: 134 MG/DL
PLATELET # BLD AUTO: 270 THOUSANDS/UL (ref 149–390)
PMV BLD AUTO: 10 FL (ref 8.9–12.7)
POTASSIUM SERPL-SCNC: 3.7 MMOL/L (ref 3.5–5.3)
PROT SERPL-MCNC: 6.9 G/DL (ref 6.4–8.4)
PSA SERPL-MCNC: 0.3 NG/ML (ref 0–4)
RBC # BLD AUTO: 4.98 MILLION/UL (ref 3.88–5.62)
SODIUM SERPL-SCNC: 138 MMOL/L (ref 135–147)
T4 FREE SERPL-MCNC: 0.7 NG/DL (ref 0.61–1.12)
TRIGL SERPL-MCNC: 82 MG/DL
TSH SERPL DL<=0.05 MIU/L-ACNC: 1.06 UIU/ML (ref 0.45–4.5)
WBC # BLD AUTO: 3.8 THOUSAND/UL (ref 4.31–10.16)

## 2024-01-11 ENCOUNTER — TRANSCRIBE ORDERS (OUTPATIENT)
Dept: CARDIAC SURGERY | Facility: CLINIC | Age: 54
End: 2024-01-11

## 2024-01-11 DIAGNOSIS — I71.21 ANEURYSM OF ASCENDING AORTA WITHOUT RUPTURE (HCC): Primary | ICD-10-CM

## 2024-02-22 ENCOUNTER — TRANSCRIBE ORDERS (OUTPATIENT)
Dept: CARDIAC SURGERY | Facility: CLINIC | Age: 54
End: 2024-02-22

## 2024-02-22 DIAGNOSIS — I71.21 ANEURYSM OF ASCENDING AORTA WITHOUT RUPTURE (HCC): Primary | ICD-10-CM

## 2024-02-27 ENCOUNTER — HOSPITAL ENCOUNTER (OUTPATIENT)
Dept: CT IMAGING | Facility: HOSPITAL | Age: 54
Discharge: HOME | End: 2024-02-27
Payer: COMMERCIAL

## 2024-02-27 DIAGNOSIS — I71.21 ANEURYSM OF ASCENDING AORTA WITHOUT RUPTURE (HCC): ICD-10-CM

## 2024-02-27 PROCEDURE — 71250 CT THORAX DX C-: CPT

## 2024-03-11 ENCOUNTER — OFFICE VISIT (OUTPATIENT)
Dept: CARDIAC SURGERY | Facility: CLINIC | Age: 54
End: 2024-03-11
Payer: COMMERCIAL

## 2024-03-11 VITALS
TEMPERATURE: 97.1 F | SYSTOLIC BLOOD PRESSURE: 137 MMHG | DIASTOLIC BLOOD PRESSURE: 91 MMHG | HEIGHT: 71 IN | BODY MASS INDEX: 26.5 KG/M2 | OXYGEN SATURATION: 97 % | HEART RATE: 82 BPM | WEIGHT: 189.3 LBS

## 2024-03-11 DIAGNOSIS — I71.20 THORACIC AORTIC ANEURYSM WITHOUT RUPTURE, UNSPECIFIED PART (HCC): Primary | ICD-10-CM

## 2024-03-11 PROCEDURE — 99214 OFFICE O/P EST MOD 30 MIN: CPT | Performed by: THORACIC SURGERY (CARDIOTHORACIC VASCULAR SURGERY)

## 2024-03-11 RX ORDER — OXYCODONE HYDROCHLORIDE 10 MG/1
10 TABLET ORAL AS NEEDED
COMMUNITY
Start: 2024-03-04
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
## 2024-05-28 ENCOUNTER — APPOINTMENT (OUTPATIENT)
Dept: LAB | Facility: CLINIC | Age: 54
End: 2024-05-28
Payer: COMMERCIAL

## 2024-05-28 DIAGNOSIS — R73.9 HYPERGLYCEMIA: ICD-10-CM

## 2024-05-28 DIAGNOSIS — N42.9 PROSTATE DISORDER: ICD-10-CM

## 2024-05-28 DIAGNOSIS — Z79.899 ENCOUNTER FOR LONG-TERM (CURRENT) USE OF OTHER MEDICATIONS: ICD-10-CM

## 2024-05-28 DIAGNOSIS — D64.9 ANEMIA, UNSPECIFIED TYPE: ICD-10-CM

## 2024-05-28 DIAGNOSIS — E55.9 VITAMIN D DEFICIENCY: ICD-10-CM

## 2024-05-28 DIAGNOSIS — Z79.891 ENCOUNTER FOR LONG-TERM USE OF OPIATE ANALGESIC: ICD-10-CM

## 2024-05-28 DIAGNOSIS — E78.5 HYPERLIPIDEMIA, UNSPECIFIED HYPERLIPIDEMIA TYPE: ICD-10-CM

## 2024-05-28 LAB
25(OH)D3 SERPL-MCNC: 30.7 NG/ML (ref 30–100)
ALBUMIN SERPL BCP-MCNC: 4.2 G/DL (ref 3.5–5)
ALP SERPL-CCNC: 85 U/L (ref 34–104)
ALT SERPL W P-5'-P-CCNC: 123 U/L (ref 7–52)
ANION GAP SERPL CALCULATED.3IONS-SCNC: 11 MMOL/L (ref 4–13)
AST SERPL W P-5'-P-CCNC: 60 U/L (ref 13–39)
BILIRUB SERPL-MCNC: 0.32 MG/DL (ref 0.2–1)
BUN SERPL-MCNC: 13 MG/DL (ref 5–25)
CALCIUM SERPL-MCNC: 9.1 MG/DL (ref 8.4–10.2)
CHLORIDE SERPL-SCNC: 102 MMOL/L (ref 96–108)
CHOLEST SERPL-MCNC: 220 MG/DL
CO2 SERPL-SCNC: 29 MMOL/L (ref 21–32)
CREAT SERPL-MCNC: 0.86 MG/DL (ref 0.6–1.3)
ERYTHROCYTE [DISTWIDTH] IN BLOOD BY AUTOMATED COUNT: 12.5 % (ref 11.6–15.1)
EST. AVERAGE GLUCOSE BLD GHB EST-MCNC: 123 MG/DL
FERRITIN SERPL-MCNC: 68 NG/ML (ref 24–336)
GFR SERPL CREATININE-BSD FRML MDRD: 96 ML/MIN/1.73SQ M
GLUCOSE P FAST SERPL-MCNC: 101 MG/DL (ref 65–99)
HBA1C MFR BLD: 5.9 %
HCT VFR BLD AUTO: 42.7 % (ref 36.5–49.3)
HDLC SERPL-MCNC: 76 MG/DL
HGB BLD-MCNC: 13.5 G/DL (ref 12–17)
IRON SATN MFR SERPL: 14 % (ref 15–50)
IRON SERPL-MCNC: 50 UG/DL (ref 50–212)
LDLC SERPL CALC-MCNC: 130 MG/DL (ref 0–100)
MCH RBC QN AUTO: 29 PG (ref 26.8–34.3)
MCHC RBC AUTO-ENTMCNC: 31.6 G/DL (ref 31.4–37.4)
MCV RBC AUTO: 92 FL (ref 82–98)
NONHDLC SERPL-MCNC: 144 MG/DL
PLATELET # BLD AUTO: 273 THOUSANDS/UL (ref 149–390)
PMV BLD AUTO: 9.6 FL (ref 8.9–12.7)
POTASSIUM SERPL-SCNC: 3.9 MMOL/L (ref 3.5–5.3)
PROT SERPL-MCNC: 7 G/DL (ref 6.4–8.4)
PSA SERPL-MCNC: 0.28 NG/ML (ref 0–4)
RBC # BLD AUTO: 4.66 MILLION/UL (ref 3.88–5.62)
SODIUM SERPL-SCNC: 142 MMOL/L (ref 135–147)
TIBC SERPL-MCNC: 357 UG/DL (ref 250–450)
TRIGL SERPL-MCNC: 69 MG/DL
UIBC SERPL-MCNC: 307 UG/DL (ref 155–355)
WBC # BLD AUTO: 5.27 THOUSAND/UL (ref 4.31–10.16)

## 2024-05-28 PROCEDURE — 84153 ASSAY OF PSA TOTAL: CPT

## 2024-05-28 PROCEDURE — 80053 COMPREHEN METABOLIC PANEL: CPT

## 2024-05-28 PROCEDURE — 36415 COLL VENOUS BLD VENIPUNCTURE: CPT

## 2024-05-28 PROCEDURE — 82306 VITAMIN D 25 HYDROXY: CPT

## 2024-05-28 PROCEDURE — 85027 COMPLETE CBC AUTOMATED: CPT

## 2024-05-28 PROCEDURE — 83550 IRON BINDING TEST: CPT

## 2024-05-28 PROCEDURE — 82728 ASSAY OF FERRITIN: CPT

## 2024-05-28 PROCEDURE — 80061 LIPID PANEL: CPT

## 2024-05-28 PROCEDURE — 82747 ASSAY OF FOLIC ACID RBC: CPT

## 2024-05-28 PROCEDURE — 83540 ASSAY OF IRON: CPT

## 2024-05-28 PROCEDURE — 83036 HEMOGLOBIN GLYCOSYLATED A1C: CPT

## 2024-05-29 LAB
FOLATE BLD-MCNC: 350 NG/ML
FOLATE RBC-MCNC: 856 NG/ML
HCT VFR BLD AUTO: 40.9 % (ref 37.5–51)

## 2025-01-09 ENCOUNTER — APPOINTMENT (OUTPATIENT)
Dept: LAB | Facility: CLINIC | Age: 55
End: 2025-01-09
Payer: COMMERCIAL

## 2025-01-09 DIAGNOSIS — Z79.899 ENCOUNTER FOR LONG-TERM (CURRENT) USE OF MEDICATIONS: ICD-10-CM

## 2025-01-09 DIAGNOSIS — E78.5 HYPERLIPIDEMIA, UNSPECIFIED HYPERLIPIDEMIA TYPE: ICD-10-CM

## 2025-01-09 DIAGNOSIS — E55.9 VITAMIN D DEFICIENCY: ICD-10-CM

## 2025-01-09 DIAGNOSIS — R73.9 HYPERGLYCEMIA: ICD-10-CM

## 2025-01-09 LAB
25(OH)D3 SERPL-MCNC: 31.4 NG/ML (ref 30–100)
ALBUMIN SERPL BCG-MCNC: 4.4 G/DL (ref 3.5–5)
ALP SERPL-CCNC: 53 U/L (ref 34–104)
ALT SERPL W P-5'-P-CCNC: 24 U/L (ref 7–52)
ANION GAP SERPL CALCULATED.3IONS-SCNC: 6 MMOL/L (ref 4–13)
AST SERPL W P-5'-P-CCNC: 21 U/L (ref 13–39)
BILIRUB SERPL-MCNC: 0.94 MG/DL (ref 0.2–1)
BUN SERPL-MCNC: 15 MG/DL (ref 5–25)
CALCIUM SERPL-MCNC: 9.6 MG/DL (ref 8.4–10.2)
CHLORIDE SERPL-SCNC: 102 MMOL/L (ref 96–108)
CHOLEST SERPL-MCNC: 202 MG/DL (ref ?–200)
CO2 SERPL-SCNC: 32 MMOL/L (ref 21–32)
CREAT SERPL-MCNC: 0.9 MG/DL (ref 0.6–1.3)
ERYTHROCYTE [DISTWIDTH] IN BLOOD BY AUTOMATED COUNT: 11.7 % (ref 11.6–15.1)
EST. AVERAGE GLUCOSE BLD GHB EST-MCNC: 114 MG/DL
GFR SERPL CREATININE-BSD FRML MDRD: 95 ML/MIN/1.73SQ M
GLUCOSE P FAST SERPL-MCNC: 103 MG/DL (ref 65–99)
HBA1C MFR BLD: 5.6 %
HCT VFR BLD AUTO: 44.3 % (ref 36.5–49.3)
HDLC SERPL-MCNC: 69 MG/DL
HGB BLD-MCNC: 14.4 G/DL (ref 12–17)
LDLC SERPL CALC-MCNC: 121 MG/DL (ref 0–100)
MCH RBC QN AUTO: 29.6 PG (ref 26.8–34.3)
MCHC RBC AUTO-ENTMCNC: 32.5 G/DL (ref 31.4–37.4)
MCV RBC AUTO: 91 FL (ref 82–98)
NONHDLC SERPL-MCNC: 133 MG/DL
PLATELET # BLD AUTO: 284 THOUSANDS/UL (ref 149–390)
PMV BLD AUTO: 9.8 FL (ref 8.9–12.7)
POTASSIUM SERPL-SCNC: 3.8 MMOL/L (ref 3.5–5.3)
PROT SERPL-MCNC: 7.4 G/DL (ref 6.4–8.4)
RBC # BLD AUTO: 4.87 MILLION/UL (ref 3.88–5.62)
SODIUM SERPL-SCNC: 140 MMOL/L (ref 135–147)
TRIGL SERPL-MCNC: 61 MG/DL (ref ?–150)
WBC # BLD AUTO: 5.48 THOUSAND/UL (ref 4.31–10.16)

## 2025-01-09 PROCEDURE — 36415 COLL VENOUS BLD VENIPUNCTURE: CPT

## 2025-01-09 PROCEDURE — 85027 COMPLETE CBC AUTOMATED: CPT

## 2025-01-09 PROCEDURE — 80061 LIPID PANEL: CPT

## 2025-01-09 PROCEDURE — 82306 VITAMIN D 25 HYDROXY: CPT

## 2025-01-09 PROCEDURE — 83036 HEMOGLOBIN GLYCOSYLATED A1C: CPT

## 2025-01-09 PROCEDURE — 80053 COMPREHEN METABOLIC PANEL: CPT

## 2025-07-29 ENCOUNTER — APPOINTMENT (EMERGENCY)
Dept: CT IMAGING | Facility: HOSPITAL | Age: 55
End: 2025-07-29
Payer: COMMERCIAL

## 2025-07-29 ENCOUNTER — HOSPITAL ENCOUNTER (EMERGENCY)
Facility: HOSPITAL | Age: 55
Discharge: HOME | End: 2025-07-30
Attending: EMERGENCY MEDICINE
Payer: COMMERCIAL

## 2025-07-29 DIAGNOSIS — L03.90 CELLULITIS: ICD-10-CM

## 2025-07-29 DIAGNOSIS — R60.0 EDEMA OF LEFT LOWER EXTREMITY: Primary | ICD-10-CM

## 2025-07-29 DIAGNOSIS — M66.0 RUPTURED BAKERS CYST: ICD-10-CM

## 2025-07-29 LAB
BASOPHILS # BLD AUTO: 0.03 THOUSANDS/ÂΜL (ref 0–0.1)
BASOPHILS NFR BLD AUTO: 1 % (ref 0–1)
EOSINOPHIL # BLD AUTO: 0.09 THOUSAND/ÂΜL (ref 0–0.61)
EOSINOPHIL NFR BLD AUTO: 2 % (ref 0–6)
ERYTHROCYTE [DISTWIDTH] IN BLOOD BY AUTOMATED COUNT: 11.9 % (ref 11.6–15.1)
HCT VFR BLD AUTO: 38.2 % (ref 36.5–49.3)
HGB BLD-MCNC: 12.5 G/DL (ref 12–17)
IMM GRANULOCYTES # BLD AUTO: 0.01 THOUSAND/UL (ref 0–0.2)
IMM GRANULOCYTES NFR BLD AUTO: 0 % (ref 0–2)
LYMPHOCYTES # BLD AUTO: 1.32 THOUSANDS/ÂΜL (ref 0.6–4.47)
LYMPHOCYTES NFR BLD AUTO: 24 % (ref 14–44)
MCH RBC QN AUTO: 29.1 PG (ref 26.8–34.3)
MCHC RBC AUTO-ENTMCNC: 32.7 G/DL (ref 31.4–37.4)
MCV RBC AUTO: 89 FL (ref 82–98)
MONOCYTES # BLD AUTO: 0.39 THOUSAND/ÂΜL (ref 0.17–1.22)
MONOCYTES NFR BLD AUTO: 7 % (ref 4–12)
NEUTROPHILS # BLD AUTO: 3.62 THOUSANDS/ÂΜL (ref 1.85–7.62)
NEUTS SEG NFR BLD AUTO: 66 % (ref 43–75)
NRBC BLD AUTO-RTO: 0 /100 WBCS
PLATELET # BLD AUTO: 206 THOUSANDS/UL (ref 149–390)
PMV BLD AUTO: 9.4 FL (ref 8.9–12.7)
RBC # BLD AUTO: 4.3 MILLION/UL (ref 3.88–5.62)
WBC # BLD AUTO: 5.46 THOUSAND/UL (ref 4.31–10.16)

## 2025-07-29 PROCEDURE — 74177 CT ABD & PELVIS W/CONTRAST: CPT

## 2025-07-29 PROCEDURE — 99284 EMERGENCY DEPT VISIT MOD MDM: CPT

## 2025-07-29 PROCEDURE — 85025 COMPLETE CBC W/AUTO DIFF WBC: CPT | Performed by: STUDENT IN AN ORGANIZED HEALTH CARE EDUCATION/TRAINING PROGRAM

## 2025-07-29 PROCEDURE — 73701 CT LOWER EXTREMITY W/DYE: CPT

## 2025-07-29 PROCEDURE — 99285 EMERGENCY DEPT VISIT HI MDM: CPT | Performed by: EMERGENCY MEDICINE

## 2025-07-29 PROCEDURE — 36415 COLL VENOUS BLD VENIPUNCTURE: CPT | Performed by: STUDENT IN AN ORGANIZED HEALTH CARE EDUCATION/TRAINING PROGRAM

## 2025-07-29 RX ADMIN — IOHEXOL 85 ML: 350 INJECTION, SOLUTION INTRAVENOUS at 20:40

## 2025-07-29 RX ADMIN — IOHEXOL 100 ML: 350 INJECTION, SOLUTION INTRAVENOUS at 20:39

## 2025-07-30 VITALS
SYSTOLIC BLOOD PRESSURE: 114 MMHG | TEMPERATURE: 97.7 F | HEART RATE: 71 BPM | RESPIRATION RATE: 18 BRPM | DIASTOLIC BLOOD PRESSURE: 66 MMHG | OXYGEN SATURATION: 92 %

## 2025-07-30 LAB
ALBUMIN SERPL BCG-MCNC: 3.8 G/DL (ref 3.5–5)
ALP SERPL-CCNC: 52 U/L (ref 34–104)
ALT SERPL W P-5'-P-CCNC: 17 U/L (ref 7–52)
ANION GAP SERPL CALCULATED.3IONS-SCNC: 5 MMOL/L (ref 4–13)
APPEARANCE FLD: NORMAL
AST SERPL W P-5'-P-CCNC: 15 U/L (ref 13–39)
ATRIAL RATE: 73 BPM
BASOPHILS NFR SNV MANUAL: 1 %
BILIRUB SERPL-MCNC: 0.38 MG/DL (ref 0.2–1)
BUN SERPL-MCNC: 16 MG/DL (ref 5–25)
CALCIUM SERPL-MCNC: 8.9 MG/DL (ref 8.4–10.2)
CHLORIDE SERPL-SCNC: 103 MMOL/L (ref 96–108)
CO2 SERPL-SCNC: 29 MMOL/L (ref 21–32)
COLOR FLD: NORMAL
CREAT SERPL-MCNC: 0.91 MG/DL (ref 0.6–1.3)
CRP SERPL QL: 4.9 MG/L
CRYSTALS SNV QL MICRO: NORMAL
ERYTHROCYTE [SEDIMENTATION RATE] IN BLOOD: 13 MM/HOUR (ref 0–19)
GFR SERPL CREATININE-BSD FRML MDRD: 93 ML/MIN/1.73SQ M
GLUCOSE SERPL-MCNC: 117 MG/DL (ref 65–140)
LACTATE SERPL-SCNC: 0.8 MMOL/L (ref 0.5–2)
LYMPHOCYTES # SNV MANUAL: 19 %
MONOCYTES NFR SNV MANUAL: 68 %
NEUTROPHILS NFR SNV MANUAL: 3 %
P AXIS: 53 DEGREES
POTASSIUM SERPL-SCNC: 3.8 MMOL/L (ref 3.5–5.3)
PR INTERVAL: 170 MS
PROCALCITONIN SERPL-MCNC: <0.05 NG/ML
PROT SERPL-MCNC: 6.5 G/DL (ref 6.4–8.4)
QRS AXIS: 5 DEGREES
QRSD INTERVAL: 142 MS
QT INTERVAL: 402 MS
QTC INTERVAL: 442 MS
RBC # SNV MANUAL: 1000 /UL (ref 0–10)
SITE: NORMAL
SODIUM SERPL-SCNC: 137 MMOL/L (ref 135–147)
SYNOVIOCYTES NFR SNV: 9 %
T WAVE AXIS: 26 DEGREES
TOTAL CELLS COUNTED SPEC: 100
VENTRICULAR RATE: 73 BPM
WBC # FLD MANUAL: 124 /UL (ref 0–200)

## 2025-07-30 PROCEDURE — 36415 COLL VENOUS BLD VENIPUNCTURE: CPT

## 2025-07-30 PROCEDURE — 89050 BODY FLUID CELL COUNT: CPT

## 2025-07-30 PROCEDURE — 89051 BODY FLUID CELL COUNT: CPT

## 2025-07-30 PROCEDURE — 89060 EXAM SYNOVIAL FLUID CRYSTALS: CPT

## 2025-07-30 PROCEDURE — 87070 CULTURE OTHR SPECIMN AEROBIC: CPT

## 2025-07-30 PROCEDURE — 87205 SMEAR GRAM STAIN: CPT

## 2025-07-30 PROCEDURE — 83605 ASSAY OF LACTIC ACID: CPT

## 2025-07-30 PROCEDURE — 93005 ELECTROCARDIOGRAM TRACING: CPT

## 2025-07-30 PROCEDURE — 87040 BLOOD CULTURE FOR BACTERIA: CPT

## 2025-07-30 PROCEDURE — 86140 C-REACTIVE PROTEIN: CPT

## 2025-07-30 PROCEDURE — 93010 ELECTROCARDIOGRAM REPORT: CPT | Performed by: INTERNAL MEDICINE

## 2025-07-30 PROCEDURE — 84145 PROCALCITONIN (PCT): CPT

## 2025-07-30 PROCEDURE — 85652 RBC SED RATE AUTOMATED: CPT

## 2025-07-30 PROCEDURE — 80053 COMPREHEN METABOLIC PANEL: CPT

## 2025-07-30 PROCEDURE — 20611 DRAIN/INJ JOINT/BURSA W/US: CPT

## 2025-07-30 RX ORDER — LIDOCAINE HYDROCHLORIDE 10 MG/ML
10 INJECTION, SOLUTION EPIDURAL; INFILTRATION; INTRACAUDAL; PERINEURAL ONCE
Status: COMPLETED | OUTPATIENT
Start: 2025-07-30 | End: 2025-07-30

## 2025-07-30 RX ORDER — SULFAMETHOXAZOLE AND TRIMETHOPRIM 800; 160 MG/1; MG/1
1 TABLET ORAL ONCE
Status: COMPLETED | OUTPATIENT
Start: 2025-07-30 | End: 2025-07-30

## 2025-07-30 RX ORDER — SULFAMETHOXAZOLE AND TRIMETHOPRIM 800; 160 MG/1; MG/1
1 TABLET ORAL 2 TIMES DAILY
Qty: 14 TABLET | Refills: 0 | Status: SHIPPED | OUTPATIENT
Start: 2025-07-30 | End: 2025-08-06

## 2025-07-30 RX ADMIN — SULFAMETHOXAZOLE AND TRIMETHOPRIM 1 TABLET: 800; 160 TABLET ORAL at 03:48

## 2025-07-30 RX ADMIN — LIDOCAINE HYDROCHLORIDE 10 ML: 10 INJECTION, SOLUTION EPIDURAL; INFILTRATION; INTRACAUDAL; PERINEURAL at 01:47

## 2025-08-02 LAB
BACTERIA SPEC BFLD CULT: NO GROWTH
GRAM STN SPEC: NORMAL

## 2025-08-04 LAB
BACTERIA BLD CULT: NORMAL
BACTERIA BLD CULT: NORMAL